# Patient Record
Sex: MALE | Race: BLACK OR AFRICAN AMERICAN
[De-identification: names, ages, dates, MRNs, and addresses within clinical notes are randomized per-mention and may not be internally consistent; named-entity substitution may affect disease eponyms.]

---

## 2019-09-20 ENCOUNTER — HOSPITAL ENCOUNTER (INPATIENT)
Dept: HOSPITAL 74 - YASAS | Age: 69
LOS: 11 days | Discharge: HOME | DRG: 895 | End: 2019-10-01
Attending: SURGERY | Admitting: SURGERY
Payer: COMMERCIAL

## 2019-09-20 VITALS — BODY MASS INDEX: 27.2 KG/M2

## 2019-09-20 DIAGNOSIS — F14.20: ICD-10-CM

## 2019-09-20 DIAGNOSIS — F10.20: Primary | ICD-10-CM

## 2019-09-20 DIAGNOSIS — Z79.84: ICD-10-CM

## 2019-09-20 DIAGNOSIS — I10: ICD-10-CM

## 2019-09-20 DIAGNOSIS — E11.9: ICD-10-CM

## 2019-09-20 PROCEDURE — HZ42ZZZ GROUP COUNSELING FOR SUBSTANCE ABUSE TREATMENT, COGNITIVE-BEHAVIORAL: ICD-10-PCS | Performed by: ALLERGY & IMMUNOLOGY

## 2019-09-20 RX ADMIN — CHLORTHALIDONE SCH MG: 25 TABLET ORAL at 17:43

## 2019-09-20 RX ADMIN — Medication SCH MG: at 21:18

## 2019-09-20 RX ADMIN — HYDRALAZINE HYDROCHLORIDE SCH MG: 50 TABLET, FILM COATED ORAL at 21:18

## 2019-09-20 RX ADMIN — METFORMIN HYDROCHLORIDE SCH MG: 500 TABLET ORAL at 17:42

## 2019-09-20 RX ADMIN — ASPIRIN 81 MG SCH MG: 81 TABLET ORAL at 17:42

## 2019-09-20 RX ADMIN — ATORVASTATIN CALCIUM SCH MG: 10 TABLET, FILM COATED ORAL at 21:18

## 2019-09-20 RX ADMIN — Medication SCH: at 21:18

## 2019-09-20 RX ADMIN — LOSARTAN POTASSIUM SCH MG: 50 TABLET, FILM COATED ORAL at 17:45
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
## 2019-09-21 LAB
ALBUMIN SERPL-MCNC: 3.8 G/DL (ref 3.4–5)
ALP SERPL-CCNC: 80 U/L (ref 45–117)
ALT SERPL-CCNC: 32 U/L (ref 13–61)
ANION GAP SERPL CALC-SCNC: 4 MMOL/L (ref 8–16)
APPEARANCE UR: CLEAR
AST SERPL-CCNC: 25 U/L (ref 15–37)
BACTERIA # UR AUTO: (no result) /HPF
BILIRUB SERPL-MCNC: 0.6 MG/DL (ref 0.2–1)
BILIRUB UR STRIP.AUTO-MCNC: NEGATIVE MG/DL
BUN SERPL-MCNC: 20.1 MG/DL (ref 7–18)
CALCIUM SERPL-MCNC: 9 MG/DL (ref 8.5–10.1)
CHLORIDE SERPL-SCNC: 104 MMOL/L (ref 98–107)
CO2 SERPL-SCNC: 32 MMOL/L (ref 21–32)
COLOR UR: YELLOW
CREAT SERPL-MCNC: 1.4 MG/DL (ref 0.55–1.3)
DEPRECATED RDW RBC AUTO: 14.9 % (ref 11.9–15.9)
EPITH CASTS URNS QL MICRO: (no result) /HPF
GLUCOSE SERPL-MCNC: 137 MG/DL (ref 74–106)
HCT VFR BLD CALC: 43.8 % (ref 35.4–49)
HGB BLD-MCNC: 14.6 GM/DL (ref 11.7–16.9)
KETONES UR QL STRIP: NEGATIVE
LEUKOCYTE ESTERASE UR QL STRIP.AUTO: (no result)
MCH RBC QN AUTO: 31.4 PG (ref 25.7–33.7)
MCHC RBC AUTO-ENTMCNC: 33.2 G/DL (ref 32–35.9)
MCV RBC: 94.4 FL (ref 80–96)
NITRITE UR QL STRIP: NEGATIVE
PH UR: 7 [PH] (ref 5–8)
PLATELET # BLD AUTO: 180 K/MM3 (ref 134–434)
PMV BLD: 10.4 FL (ref 7.5–11.1)
POTASSIUM SERPLBLD-SCNC: 3.7 MMOL/L (ref 3.5–5.1)
PROT SERPL-MCNC: 7.4 G/DL (ref 6.4–8.2)
PROT UR QL STRIP: (no result)
PROT UR QL STRIP: NEGATIVE
RBC # BLD AUTO: (no result) /HPF (ref 0–4)
RBC # BLD AUTO: 4.64 M/MM3 (ref 4–5.6)
SODIUM SERPL-SCNC: 141 MMOL/L (ref 136–145)
SP GR UR: 1.01 (ref 1.01–1.03)
UROBILINOGEN UR STRIP-MCNC: 1 MG/DL (ref 0.2–1)
WBC # BLD AUTO: 6.6 K/MM3 (ref 4–10)
WBC # UR AUTO: (no result) /HPF (ref 0–5)

## 2019-09-21 RX ADMIN — HYDRALAZINE HYDROCHLORIDE SCH MG: 50 TABLET, FILM COATED ORAL at 21:23

## 2019-09-21 RX ADMIN — LEVOTHYROXINE SODIUM SCH MCG: 100 TABLET ORAL at 06:16

## 2019-09-21 RX ADMIN — LOSARTAN POTASSIUM SCH MG: 50 TABLET, FILM COATED ORAL at 09:34

## 2019-09-21 RX ADMIN — ATORVASTATIN CALCIUM SCH MG: 10 TABLET, FILM COATED ORAL at 21:23

## 2019-09-21 RX ADMIN — HYDRALAZINE HYDROCHLORIDE SCH MG: 50 TABLET, FILM COATED ORAL at 09:34

## 2019-09-21 RX ADMIN — AMLODIPINE BESYLATE SCH MG: 10 TABLET ORAL at 07:08

## 2019-09-21 RX ADMIN — Medication SCH TAB: at 09:35

## 2019-09-21 RX ADMIN — METFORMIN HYDROCHLORIDE SCH MG: 500 TABLET ORAL at 16:25

## 2019-09-21 RX ADMIN — ASPIRIN 81 MG SCH MG: 81 TABLET ORAL at 09:34

## 2019-09-21 RX ADMIN — Medication SCH: at 21:23

## 2019-09-21 RX ADMIN — CHLORTHALIDONE SCH MG: 25 TABLET ORAL at 09:35

## 2019-09-21 RX ADMIN — Medication SCH APPLIC: at 09:34

## 2019-09-21 RX ADMIN — METFORMIN HYDROCHLORIDE SCH MG: 500 TABLET ORAL at 06:16

## 2019-09-21 RX ADMIN — Medication SCH MG: at 21:22

## 2019-09-21 NOTE — PN
[facility] Progress Note


Note: 


Patient is referred for elevated blood pressure, currently on multiple agents. 

He denies headache, dizziness, blurred vision or any associated symptoms.


Patient reports his blood pressure has been high for years


PE


HEENT:Unremarkable


Chest: Lungs clear in all fields


CVS: S1S2, RRR  





 Last Vital Signs











Temp Pulse Resp BP Pulse Ox


 


 98.3 F   76   18   145/92    


 


 09/21/19 17:49  09/21/19 17:49  09/21/19 17:49  09/21/19 17:49   











Plan: Increase Toprol XL to 50mg


Clonidine 0.1mg x 1 dose given earlier


Monitoring ongoing

## 2019-09-22 RX ADMIN — Medication SCH MG: at 21:23

## 2019-09-22 RX ADMIN — ATORVASTATIN CALCIUM SCH MG: 10 TABLET, FILM COATED ORAL at 21:23

## 2019-09-22 RX ADMIN — Medication SCH APPLIC: at 09:37

## 2019-09-22 RX ADMIN — METFORMIN HYDROCHLORIDE SCH MG: 500 TABLET ORAL at 16:40

## 2019-09-22 RX ADMIN — LEVOTHYROXINE SODIUM SCH MCG: 100 TABLET ORAL at 06:27

## 2019-09-22 RX ADMIN — LOSARTAN POTASSIUM SCH MG: 50 TABLET, FILM COATED ORAL at 09:37

## 2019-09-22 RX ADMIN — HYDRALAZINE HYDROCHLORIDE SCH MG: 50 TABLET, FILM COATED ORAL at 09:36

## 2019-09-22 RX ADMIN — Medication SCH TAB: at 09:38

## 2019-09-22 RX ADMIN — HYDRALAZINE HYDROCHLORIDE SCH MG: 50 TABLET, FILM COATED ORAL at 21:23

## 2019-09-22 RX ADMIN — CHLORTHALIDONE SCH MG: 25 TABLET ORAL at 09:37

## 2019-09-22 RX ADMIN — ASPIRIN 81 MG SCH MG: 81 TABLET ORAL at 09:37

## 2019-09-22 RX ADMIN — METFORMIN HYDROCHLORIDE SCH MG: 500 TABLET ORAL at 06:27

## 2019-09-22 RX ADMIN — AMLODIPINE BESYLATE SCH MG: 10 TABLET ORAL at 06:27

## 2019-09-22 RX ADMIN — Medication SCH APPLIC: at 21:23

## 2019-09-22 RX ADMIN — Medication PRN MG: at 21:23

## 2019-09-23 RX ADMIN — HYDRALAZINE HYDROCHLORIDE SCH MG: 50 TABLET, FILM COATED ORAL at 10:05

## 2019-09-23 RX ADMIN — METFORMIN HYDROCHLORIDE SCH MG: 500 TABLET ORAL at 17:32

## 2019-09-23 RX ADMIN — Medication SCH: at 21:28

## 2019-09-23 RX ADMIN — Medication SCH TAB: at 10:05

## 2019-09-23 RX ADMIN — METFORMIN HYDROCHLORIDE SCH MG: 500 TABLET ORAL at 06:14

## 2019-09-23 RX ADMIN — ATORVASTATIN CALCIUM SCH MG: 10 TABLET, FILM COATED ORAL at 21:28

## 2019-09-23 RX ADMIN — LOSARTAN POTASSIUM SCH MG: 50 TABLET, FILM COATED ORAL at 10:06

## 2019-09-23 RX ADMIN — Medication SCH MG: at 21:28

## 2019-09-23 RX ADMIN — ASPIRIN 81 MG SCH MG: 81 TABLET ORAL at 10:05

## 2019-09-23 RX ADMIN — LEVOTHYROXINE SODIUM SCH MCG: 100 TABLET ORAL at 06:14

## 2019-09-23 RX ADMIN — CHLORTHALIDONE SCH MG: 25 TABLET ORAL at 10:06

## 2019-09-23 RX ADMIN — Medication SCH APPLIC: at 10:07

## 2019-09-23 RX ADMIN — HYDRALAZINE HYDROCHLORIDE SCH MG: 50 TABLET, FILM COATED ORAL at 21:28

## 2019-09-23 RX ADMIN — AMLODIPINE BESYLATE SCH MG: 10 TABLET ORAL at 06:14

## 2019-09-24 RX ADMIN — METFORMIN HYDROCHLORIDE SCH MG: 500 TABLET ORAL at 06:26

## 2019-09-24 RX ADMIN — LOSARTAN POTASSIUM SCH MG: 50 TABLET, FILM COATED ORAL at 09:54

## 2019-09-24 RX ADMIN — ASPIRIN 81 MG SCH MG: 81 TABLET ORAL at 09:54

## 2019-09-24 RX ADMIN — Medication SCH: at 21:21

## 2019-09-24 RX ADMIN — HYDRALAZINE HYDROCHLORIDE SCH MG: 50 TABLET, FILM COATED ORAL at 09:54

## 2019-09-24 RX ADMIN — AMLODIPINE BESYLATE SCH MG: 10 TABLET ORAL at 06:25

## 2019-09-24 RX ADMIN — CHLORTHALIDONE SCH MG: 25 TABLET ORAL at 09:54

## 2019-09-24 RX ADMIN — ATORVASTATIN CALCIUM SCH MG: 10 TABLET, FILM COATED ORAL at 21:20

## 2019-09-24 RX ADMIN — Medication SCH TAB: at 09:54

## 2019-09-24 RX ADMIN — Medication SCH APPLIC: at 09:54

## 2019-09-24 RX ADMIN — Medication PRN MG: at 21:21

## 2019-09-24 RX ADMIN — LEVOTHYROXINE SODIUM SCH MCG: 100 TABLET ORAL at 06:25

## 2019-09-24 RX ADMIN — HYDRALAZINE HYDROCHLORIDE SCH MG: 50 TABLET, FILM COATED ORAL at 21:21

## 2019-09-24 RX ADMIN — Medication SCH MG: at 21:20

## 2019-09-24 RX ADMIN — METFORMIN HYDROCHLORIDE SCH MG: 500 TABLET ORAL at 16:24

## 2019-09-25 RX ADMIN — Medication SCH: at 21:20

## 2019-09-25 RX ADMIN — ATORVASTATIN CALCIUM SCH MG: 10 TABLET, FILM COATED ORAL at 21:20

## 2019-09-25 RX ADMIN — CHLORTHALIDONE SCH MG: 25 TABLET ORAL at 10:15

## 2019-09-25 RX ADMIN — LEVOTHYROXINE SODIUM SCH MCG: 100 TABLET ORAL at 06:13

## 2019-09-25 RX ADMIN — METFORMIN HYDROCHLORIDE SCH MG: 500 TABLET ORAL at 16:38

## 2019-09-25 RX ADMIN — Medication SCH MG: at 21:20

## 2019-09-25 RX ADMIN — ASPIRIN 81 MG SCH MG: 81 TABLET ORAL at 10:14

## 2019-09-25 RX ADMIN — METFORMIN HYDROCHLORIDE SCH MG: 500 TABLET ORAL at 06:13

## 2019-09-25 RX ADMIN — HYDRALAZINE HYDROCHLORIDE SCH MG: 50 TABLET, FILM COATED ORAL at 21:20

## 2019-09-25 RX ADMIN — LOSARTAN POTASSIUM SCH MG: 50 TABLET, FILM COATED ORAL at 10:14

## 2019-09-25 RX ADMIN — AMLODIPINE BESYLATE SCH MG: 10 TABLET ORAL at 06:13

## 2019-09-25 RX ADMIN — Medication SCH APPLIC: at 10:16

## 2019-09-25 RX ADMIN — HYDRALAZINE HYDROCHLORIDE SCH MG: 50 TABLET, FILM COATED ORAL at 10:15

## 2019-09-25 RX ADMIN — Medication SCH TAB: at 10:14

## 2019-09-26 RX ADMIN — Medication SCH: at 09:36

## 2019-09-26 RX ADMIN — METFORMIN HYDROCHLORIDE SCH MG: 500 TABLET ORAL at 16:36

## 2019-09-26 RX ADMIN — ASPIRIN 81 MG SCH MG: 81 TABLET ORAL at 09:35

## 2019-09-26 RX ADMIN — AMLODIPINE BESYLATE SCH MG: 10 TABLET ORAL at 06:24

## 2019-09-26 RX ADMIN — ATORVASTATIN CALCIUM SCH MG: 10 TABLET, FILM COATED ORAL at 21:13

## 2019-09-26 RX ADMIN — LEVOTHYROXINE SODIUM SCH MCG: 100 TABLET ORAL at 06:24

## 2019-09-26 RX ADMIN — TOLNAFTATE SCH: 1 POWDER TOPICAL at 21:13

## 2019-09-26 RX ADMIN — Medication SCH: at 21:13

## 2019-09-26 RX ADMIN — TOLNAFTATE SCH APPLIC: 1 POWDER TOPICAL at 11:00

## 2019-09-26 RX ADMIN — METFORMIN HYDROCHLORIDE SCH MG: 500 TABLET ORAL at 06:24

## 2019-09-26 RX ADMIN — CHLORTHALIDONE SCH MG: 25 TABLET ORAL at 09:35

## 2019-09-26 RX ADMIN — LOSARTAN POTASSIUM SCH MG: 50 TABLET, FILM COATED ORAL at 09:35

## 2019-09-26 RX ADMIN — Medication SCH TAB: at 09:35

## 2019-09-26 RX ADMIN — Medication SCH MG: at 21:13

## 2019-09-26 RX ADMIN — HYDRALAZINE HYDROCHLORIDE SCH MG: 50 TABLET, FILM COATED ORAL at 09:34

## 2019-09-26 RX ADMIN — HYDRALAZINE HYDROCHLORIDE SCH MG: 50 TABLET, FILM COATED ORAL at 21:13

## 2019-09-27 RX ADMIN — LOSARTAN POTASSIUM SCH MG: 50 TABLET, FILM COATED ORAL at 09:34

## 2019-09-27 RX ADMIN — METFORMIN HYDROCHLORIDE SCH MG: 500 TABLET ORAL at 16:26

## 2019-09-27 RX ADMIN — METFORMIN HYDROCHLORIDE SCH MG: 500 TABLET ORAL at 06:21

## 2019-09-27 RX ADMIN — AMLODIPINE BESYLATE SCH MG: 10 TABLET ORAL at 06:21

## 2019-09-27 RX ADMIN — HYDRALAZINE HYDROCHLORIDE SCH MG: 50 TABLET, FILM COATED ORAL at 21:18

## 2019-09-27 RX ADMIN — Medication SCH MG: at 21:18

## 2019-09-27 RX ADMIN — LEVOTHYROXINE SODIUM SCH MCG: 100 TABLET ORAL at 06:21

## 2019-09-27 RX ADMIN — CHLORTHALIDONE SCH MG: 25 TABLET ORAL at 09:35

## 2019-09-27 RX ADMIN — ATORVASTATIN CALCIUM SCH MG: 10 TABLET, FILM COATED ORAL at 21:18

## 2019-09-27 RX ADMIN — HYDRALAZINE HYDROCHLORIDE SCH MG: 50 TABLET, FILM COATED ORAL at 09:35

## 2019-09-27 RX ADMIN — ASPIRIN 81 MG SCH MG: 81 TABLET ORAL at 09:35

## 2019-09-27 RX ADMIN — TOLNAFTATE SCH APPLIC: 1 POWDER TOPICAL at 21:18

## 2019-09-27 RX ADMIN — Medication SCH: at 21:18

## 2019-09-27 RX ADMIN — Medication SCH: at 09:35

## 2019-09-27 RX ADMIN — Medication SCH TAB: at 09:34

## 2019-09-27 RX ADMIN — TOLNAFTATE SCH APPLIC: 1 POWDER TOPICAL at 09:36

## 2019-09-28 RX ADMIN — HYDRALAZINE HYDROCHLORIDE SCH MG: 50 TABLET, FILM COATED ORAL at 21:24

## 2019-09-28 RX ADMIN — TOLNAFTATE SCH APPLIC: 1 POWDER TOPICAL at 09:46

## 2019-09-28 RX ADMIN — AMLODIPINE BESYLATE SCH MG: 10 TABLET ORAL at 06:30

## 2019-09-28 RX ADMIN — METFORMIN HYDROCHLORIDE SCH MG: 500 TABLET ORAL at 07:42

## 2019-09-28 RX ADMIN — HYDRALAZINE HYDROCHLORIDE SCH MG: 50 TABLET, FILM COATED ORAL at 09:44

## 2019-09-28 RX ADMIN — LEVOTHYROXINE SODIUM SCH MCG: 100 TABLET ORAL at 06:30

## 2019-09-28 RX ADMIN — TOLNAFTATE SCH: 1 POWDER TOPICAL at 21:24

## 2019-09-28 RX ADMIN — ASPIRIN 81 MG SCH MG: 81 TABLET ORAL at 09:44

## 2019-09-28 RX ADMIN — Medication SCH: at 09:45

## 2019-09-28 RX ADMIN — LOSARTAN POTASSIUM SCH MG: 50 TABLET, FILM COATED ORAL at 09:44

## 2019-09-28 RX ADMIN — Medication SCH TAB: at 09:44

## 2019-09-28 RX ADMIN — Medication SCH MG: at 21:24

## 2019-09-28 RX ADMIN — ATORVASTATIN CALCIUM SCH MG: 10 TABLET, FILM COATED ORAL at 21:24

## 2019-09-28 RX ADMIN — CHLORTHALIDONE SCH MG: 25 TABLET ORAL at 09:44

## 2019-09-28 RX ADMIN — METFORMIN HYDROCHLORIDE SCH MG: 500 TABLET ORAL at 17:09

## 2019-09-28 RX ADMIN — Medication SCH APPLIC: at 21:24

## 2019-09-29 RX ADMIN — HYDRALAZINE HYDROCHLORIDE SCH MG: 50 TABLET, FILM COATED ORAL at 21:30

## 2019-09-29 RX ADMIN — TOLNAFTATE SCH: 1 POWDER TOPICAL at 21:30

## 2019-09-29 RX ADMIN — CHLORTHALIDONE SCH MG: 25 TABLET ORAL at 10:03

## 2019-09-29 RX ADMIN — HYDRALAZINE HYDROCHLORIDE SCH MG: 50 TABLET, FILM COATED ORAL at 10:03

## 2019-09-29 RX ADMIN — LEVOTHYROXINE SODIUM SCH MCG: 100 TABLET ORAL at 06:20

## 2019-09-29 RX ADMIN — METFORMIN HYDROCHLORIDE SCH MG: 500 TABLET ORAL at 06:20

## 2019-09-29 RX ADMIN — AMLODIPINE BESYLATE SCH MG: 10 TABLET ORAL at 05:42

## 2019-09-29 RX ADMIN — Medication SCH TAB: at 10:03

## 2019-09-29 RX ADMIN — Medication SCH: at 21:30

## 2019-09-29 RX ADMIN — ASPIRIN 81 MG SCH MG: 81 TABLET ORAL at 10:03

## 2019-09-29 RX ADMIN — LOSARTAN POTASSIUM SCH MG: 50 TABLET, FILM COATED ORAL at 10:03

## 2019-09-29 RX ADMIN — Medication SCH MG: at 21:29

## 2019-09-29 RX ADMIN — TOLNAFTATE SCH: 1 POWDER TOPICAL at 10:04

## 2019-09-29 RX ADMIN — Medication SCH: at 10:04

## 2019-09-29 RX ADMIN — ATORVASTATIN CALCIUM SCH MG: 10 TABLET, FILM COATED ORAL at 21:30

## 2019-09-29 RX ADMIN — METFORMIN HYDROCHLORIDE SCH MG: 500 TABLET ORAL at 16:39

## 2019-09-30 RX ADMIN — LOSARTAN POTASSIUM SCH MG: 50 TABLET, FILM COATED ORAL at 09:46

## 2019-09-30 RX ADMIN — Medication SCH TAB: at 09:46

## 2019-09-30 RX ADMIN — Medication SCH MG: at 21:19

## 2019-09-30 RX ADMIN — ATORVASTATIN CALCIUM SCH MG: 10 TABLET, FILM COATED ORAL at 21:19

## 2019-09-30 RX ADMIN — TOLNAFTATE SCH APPLIC: 1 POWDER TOPICAL at 09:48

## 2019-09-30 RX ADMIN — AMLODIPINE BESYLATE SCH MG: 10 TABLET ORAL at 06:07

## 2019-09-30 RX ADMIN — HYDRALAZINE HYDROCHLORIDE SCH MG: 50 TABLET, FILM COATED ORAL at 09:46

## 2019-09-30 RX ADMIN — METFORMIN HYDROCHLORIDE SCH MG: 500 TABLET ORAL at 16:42

## 2019-09-30 RX ADMIN — LEVOTHYROXINE SODIUM SCH MCG: 100 TABLET ORAL at 06:07

## 2019-09-30 RX ADMIN — HYDRALAZINE HYDROCHLORIDE SCH MG: 50 TABLET, FILM COATED ORAL at 21:19

## 2019-09-30 RX ADMIN — METFORMIN HYDROCHLORIDE SCH MG: 500 TABLET ORAL at 06:07

## 2019-09-30 RX ADMIN — Medication SCH APPLIC: at 09:47

## 2019-09-30 RX ADMIN — Medication SCH: at 21:19

## 2019-09-30 RX ADMIN — ASPIRIN 81 MG SCH MG: 81 TABLET ORAL at 09:46

## 2019-09-30 RX ADMIN — TOLNAFTATE SCH: 1 POWDER TOPICAL at 21:19

## 2019-09-30 RX ADMIN — CHLORTHALIDONE SCH MG: 25 TABLET ORAL at 09:47

## 2019-10-01 VITALS — DIASTOLIC BLOOD PRESSURE: 99 MMHG | SYSTOLIC BLOOD PRESSURE: 159 MMHG | HEART RATE: 67 BPM | TEMPERATURE: 98.2 F

## 2019-10-01 RX ADMIN — METFORMIN HYDROCHLORIDE SCH MG: 500 TABLET ORAL at 06:23

## 2019-10-01 RX ADMIN — ASPIRIN 81 MG SCH MG: 81 TABLET ORAL at 09:07

## 2019-10-01 RX ADMIN — AMLODIPINE BESYLATE SCH MG: 10 TABLET ORAL at 06:23

## 2019-10-01 RX ADMIN — CHLORTHALIDONE SCH MG: 25 TABLET ORAL at 09:07

## 2019-10-01 RX ADMIN — Medication SCH TAB: at 09:06

## 2019-10-01 RX ADMIN — LOSARTAN POTASSIUM SCH MG: 50 TABLET, FILM COATED ORAL at 09:06

## 2019-10-01 RX ADMIN — LEVOTHYROXINE SODIUM SCH MCG: 100 TABLET ORAL at 06:23

## 2019-10-01 RX ADMIN — HYDRALAZINE HYDROCHLORIDE SCH MG: 50 TABLET, FILM COATED ORAL at 09:07

## 2019-10-01 NOTE — DS
[facility] Rehab Discharge Summary





- [facility] Rehab Discharge Summary


Admission Date: 09/20/19


Discharge Date: 10/01/19





- History


Pertinent Past History: 





pt was admitted on 9/20 for rehab services- etoh use, crack cocaine use.


Pt wants to leave today- did not give a reason. Pt states has all medications 

at home. Will f/u with PCP for further med services.


Pt met with counselor.














- Discharge Physical Exam


Vital Signs: 


 Vital Signs











Temperature  98.2 F   10/01/19 07:00


 


Pulse Rate  67   10/01/19 07:00


 


Respiratory Rate  18   10/01/19 07:00


 


Blood Pressure  159/99   10/01/19 07:00


 


O2 Sat by Pulse Oximetry (%)      














- Treatment


Discharge Condition: Discharge condition good





- Medication


Discharge Medications: 


Ambulatory Orders





ASA - 81 09/20/19 


Amlodipine Besylate  09/20/19 


Atorvastatin Ca  09/20/19 


Hydralazine HCl 50 09/20/19 


Levothyroxine Sodium [Levo-T]  09/20/19 


Losartan Potassium 100 mg PO 09/20/19 


Metformin HCl 1,000 09/20/19 


Metoprolol Succinate 25 09/20/19 











- Medication-Assisted Treatment (MAT)


Medication-Assisted Treatment (MAT): No





- Discharge Instructions


Diet, activity, other medical instructions: 





Diet:





Activity: 





Other medical instructions:








- Follow-up Referral


Minutes to complete discharge: 30